# Patient Record
Sex: FEMALE | Race: BLACK OR AFRICAN AMERICAN | NOT HISPANIC OR LATINO | Employment: OTHER | URBAN - METROPOLITAN AREA
[De-identification: names, ages, dates, MRNs, and addresses within clinical notes are randomized per-mention and may not be internally consistent; named-entity substitution may affect disease eponyms.]

---

## 2018-12-04 ENCOUNTER — OFFICE VISIT (OUTPATIENT)
Dept: PHYSICAL MEDICINE AND REHAB | Facility: CLINIC | Age: 63
End: 2018-12-04
Attending: PHYSICAL MEDICINE & REHABILITATION
Payer: MEDICARE

## 2018-12-04 VITALS
DIASTOLIC BLOOD PRESSURE: 84 MMHG | SYSTOLIC BLOOD PRESSURE: 160 MMHG | HEIGHT: 67 IN | HEART RATE: 94 BPM | BODY MASS INDEX: 36.47 KG/M2 | WEIGHT: 232.38 LBS

## 2018-12-04 DIAGNOSIS — R26.9 GAIT DISORDER: Primary | ICD-10-CM

## 2018-12-04 DIAGNOSIS — G89.29 CHRONIC MIDLINE LOW BACK PAIN WITH RIGHT-SIDED SCIATICA: ICD-10-CM

## 2018-12-04 DIAGNOSIS — E66.9 OBESITY (BMI 35.0-39.9 WITHOUT COMORBIDITY): ICD-10-CM

## 2018-12-04 DIAGNOSIS — M54.41 CHRONIC MIDLINE LOW BACK PAIN WITH RIGHT-SIDED SCIATICA: ICD-10-CM

## 2018-12-04 DIAGNOSIS — E03.9 HYPOTHYROIDISM, UNSPECIFIED TYPE: ICD-10-CM

## 2018-12-04 DIAGNOSIS — M79.642 PAIN IN BOTH HANDS: ICD-10-CM

## 2018-12-04 DIAGNOSIS — R53.83 FATIGUE, UNSPECIFIED TYPE: ICD-10-CM

## 2018-12-04 DIAGNOSIS — M79.641 PAIN IN BOTH HANDS: ICD-10-CM

## 2018-12-04 DIAGNOSIS — R29.6 FREQUENT FALLS: ICD-10-CM

## 2018-12-04 DIAGNOSIS — M54.2 CHRONIC NECK PAIN: ICD-10-CM

## 2018-12-04 DIAGNOSIS — M17.0 PRIMARY OSTEOARTHRITIS OF BOTH KNEES: ICD-10-CM

## 2018-12-04 DIAGNOSIS — G89.29 CHRONIC NECK PAIN: ICD-10-CM

## 2018-12-04 PROCEDURE — 99204 OFFICE O/P NEW MOD 45 MIN: CPT | Mod: S$GLB,,, | Performed by: PHYSICAL MEDICINE & REHABILITATION

## 2018-12-04 PROCEDURE — 99999 PR PBB SHADOW E&M-NEW PATIENT-LVL III: CPT | Mod: PBBFAC,,, | Performed by: PHYSICAL MEDICINE & REHABILITATION

## 2018-12-04 PROCEDURE — 3008F BODY MASS INDEX DOCD: CPT | Mod: CPTII,S$GLB,, | Performed by: PHYSICAL MEDICINE & REHABILITATION

## 2018-12-04 NOTE — PROGRESS NOTES
Subjective:       Patient ID: Shanae Durand is a 63 y.o. female.    Chief Complaint: No chief complaint on file.    HPI     HISTORY OF PRESENT ILLNESS:  Mrs. Durand is a 63-year-old black female with multiple medical problems who is presenting to the Physical Medicine Clinic for evaluation for a power mobility device.  Her past medical history is significant for hypertension, diabetes mellitus, hypothyroidism, hepatitis C status post treatment with Harvoni, OA of the knees status post steroid injections with no relief, chronic neck pain, and obesity (weight of 232.4 pounds and a BMI of 36.4 today).    The patient lives alone in a single-mi home with three steps to enter.  She is independent with feeding, dressing, grooming and toileting.  She has trouble getting in and out of the bathtub.  She can ambulate with a rolling walker.  When her symptoms are active, she can go about 5 or 10 feet and is restricted by severe right knee pain (up to 10/10) and chronic low back pain with right-sided sciatica (5-10/10).  She also gets tired easily.  She cannot propel a manual wheelchair due to bilateral hand pain (up to 7-8/10) and to getting tired easily.  She reports history of falling, sometimes three to four times per month and so far without any serious sequelae.  She tried scooters at [employer] and was able to manipulate them without significant problems.      MS/HN  dd: 12/04/2018 16:47:52 (CST)  td: 12/05/2018 10:26:51 (CST)  Doc ID   #[id number]  Job ID #[id number]          Review of Systems   Constitutional: Positive for fatigue.   Eyes: Negative for visual disturbance.   Respiratory: Negative for shortness of breath.    Cardiovascular: Negative for chest pain.   Gastrointestinal: Positive for constipation. Negative for nausea and vomiting.   Genitourinary: Negative for difficulty urinating.   Musculoskeletal: Positive for arthralgias, back pain, gait problem and neck pain.   Neurological: Positive for weakness.  Negative for dizziness and headaches.   Psychiatric/Behavioral: Positive for sleep disturbance. Negative for behavioral problems.       Objective:      Physical Exam   Constitutional: She is oriented to person, place, and time. She appears well-developed and well-nourished.   HENT:   Head: Normocephalic and atraumatic.   Neck:   Decreased ROM.  Mild pain with rotation.  Mild tenderness.   Cardiovascular: Normal rate, regular rhythm and normal heart sounds.   Pulmonary/Chest: Effort normal and breath sounds normal.   Abdominal: Soft.   Musculoskeletal:   BUE:  ROM:   RUE: full.   LUE: full.  Strength:    RUE: 4-/5 at shoulder abduction, 4 elbow flexion, 4 elbow extension, 4 hand .   LUE: 4-/5 at shoulder abduction, 4 elbow flexion, 4 elbow extension, 4 hand .  Sensation to pinprick:   RUE: intact.   LUE: intact.  DTR:    RUE: +2 biceps, +2 triceps.   LUE:  +2 biceps, +2 triceps.  Lackey:   RUE: +ve.   LUE: +ve.    BLE:  ROM:   RLE: full.   LLE: full.  Knee crepitus:   RLE: +ve.   LLE: +ve.   Strength:    RLE: 3/5 at hip flexion, 4 knee extension, 4+ ankle DF, 4+ PF.   LLE: 4/5 at hip flexion, 4 knee extension, 4+ ankle DF, 4+ PF.  Sensation to pinprick:     RLE: intact.      LLE: intact.   DTR:     RLE: +3 knee, +2 ankle.    LLE: +3 knee, +2 ankle.  Clonus:    Rt ankle: +ve.    Lt ankle: +ve.  SLR (sitting):      RLE: +ve.      LLE: -ve.  +ve tenderness over lumbar spine.    Gait: WNL     Neurological: She is alert and oriented to person, place, and time.   Skin: Skin is warm.   Psychiatric: She has a normal mood and affect. Her behavior is normal.   Vitals reviewed.      Assessment:       1. Gait disorder    2. Primary osteoarthritis of both knees    3. Chronic midline low back pain with right-sided sciatica    4. Hypothyroidism, unspecified type    5. Chronic neck pain    6. Pain in both hands    7. Frequent falls    8. Obesity (BMI 35.0-39.9 without comorbidity)        Summary/Plan:           - The  patient was seen today for mobility evaluation for a power mobility device due to significant impairment at home.  - The patient has multifactorial gait impairment.  - The patient is not able to ambulate safely to the kitchen or living room.  - The patient is unable to use a walker functional distances due to severe Rt knee pain b/o advanced OA, chronic LBP with Rt radiculopathy, fatigue b/o hypothyroidism and obesity.  - The patient is unable to use an optimally-configured manual wheelchair at home due to bilateral hand pain b/o OA, fatigue b/o hypothyroidism and obesity.  - The patient has history of falls, which could be detrimental to her health.  - The patient has intact cognition and should be able to use a power mobility device well at home.  - The patient was given a prescription for an electric scooter.  - The patient has enough upper & lower extremity strength to be able to transfer to and from the power mobility device.  - The patient has enough range of motion & strength in BUE to allow functional operation of the tiller.  - The patient has good trunk balance and should be able to maintain a safe posture while operating a power mobility device.  - This will allow the patient to go safely to the kitchen, dining room or living room for feeding & socialization.  - The patient was also given a prescription for a Transfer Tub Bench to improve safety of bathing.  - The patient is to return the Physical Medicine/Mobility clinic prn.      For her pain management:  - X-ray AP Standing Knees with Both Latera; Future  - Ambulatory consult to Orthopedics  - X-Ray Lumbar Spine Ap Lateral w/Flex Ext; Future  - X-Ray Cervical Spine AP Lat with Flex Ex; Future (due to some upper motor neuron signs).

## 2018-12-04 NOTE — LETTER
December 4, 2018      Patrizia Harrison MD  [address]  [address]  [facility] [address]           [facility]  [address]  [facility] [address]  Phone: [phone]          Patient: Shanae Durand   MR Number: [id number]   YOB: 1955   Date of Visit: 12/4/2018       Dear Dr. Patrizia Harrison:    Thank you for referring Shanae Durand to me for evaluation. Attached you will find relevant portions of my assessment and plan of care.    If you have questions, please do not hesitate to call me. I look forward to following Shanae Durand along with you.    Sincerely,    Soraida Salazar MD    Enclosure  CC:  No Recipients    If you would like to receive this communication electronically, please contact [email or web address] or [phone] to request more information on [employer] Link access.    For providers and/or their staff who would like to refer a patient to [facility], please contact us through our one-stop-shop provider referral line, [facility], at [phone].    If you feel you have received this communication in error or would no longer like to receive these types of communications, please e-mail [email or web address]

## 2018-12-07 ENCOUNTER — HOSPITAL ENCOUNTER (OUTPATIENT)
Dept: RADIOLOGY | Facility: HOSPITAL | Age: 63
Discharge: HOME | End: 2018-12-07
Attending: PHYSICAL MEDICINE & REHABILITATION
Payer: MEDICARE

## 2018-12-07 DIAGNOSIS — M54.2 CHRONIC NECK PAIN: ICD-10-CM

## 2018-12-07 DIAGNOSIS — G89.29 CHRONIC NECK PAIN: ICD-10-CM

## 2018-12-07 DIAGNOSIS — M17.0 PRIMARY OSTEOARTHRITIS OF BOTH KNEES: ICD-10-CM

## 2018-12-07 DIAGNOSIS — M54.41 CHRONIC MIDLINE LOW BACK PAIN WITH RIGHT-SIDED SCIATICA: ICD-10-CM

## 2018-12-07 DIAGNOSIS — G89.29 CHRONIC MIDLINE LOW BACK PAIN WITH RIGHT-SIDED SCIATICA: ICD-10-CM

## 2018-12-07 PROCEDURE — 72050 X-RAY EXAM NECK SPINE 4/5VWS: CPT | Mod: TC

## 2018-12-07 PROCEDURE — 73560 X-RAY EXAM OF KNEE 1 OR 2: CPT | Mod: TC,50

## 2018-12-07 PROCEDURE — 72120 X-RAY BEND ONLY L-S SPINE: CPT | Mod: 26,,, | Performed by: RADIOLOGY

## 2018-12-07 PROCEDURE — 72050 X-RAY EXAM NECK SPINE 4/5VWS: CPT | Mod: 26,,, | Performed by: RADIOLOGY

## 2018-12-07 PROCEDURE — 72120 X-RAY BEND ONLY L-S SPINE: CPT | Mod: TC

## 2018-12-07 PROCEDURE — 73560 X-RAY EXAM OF KNEE 1 OR 2: CPT | Mod: 26,50,, | Performed by: RADIOLOGY

## 2018-12-07 PROCEDURE — 72100 X-RAY EXAM L-S SPINE 2/3 VWS: CPT | Mod: 26,,, | Performed by: RADIOLOGY

## 2018-12-21 ENCOUNTER — OFFICE VISIT (OUTPATIENT)
Dept: ORTHOPEDICS | Facility: CLINIC | Age: 63
End: 2018-12-21
Payer: MEDICARE

## 2018-12-21 ENCOUNTER — HOSPITAL ENCOUNTER (OUTPATIENT)
Dept: RADIOLOGY | Facility: HOSPITAL | Age: 63
Discharge: HOME | End: 2018-12-21
Attending: NURSE PRACTITIONER
Payer: MEDICARE

## 2018-12-21 DIAGNOSIS — M25.562 PAIN IN BOTH KNEES, UNSPECIFIED CHRONICITY: Primary | ICD-10-CM

## 2018-12-21 DIAGNOSIS — M25.561 PAIN IN BOTH KNEES, UNSPECIFIED CHRONICITY: ICD-10-CM

## 2018-12-21 DIAGNOSIS — M25.561 PAIN IN BOTH KNEES, UNSPECIFIED CHRONICITY: Primary | ICD-10-CM

## 2018-12-21 DIAGNOSIS — M25.562 PAIN IN BOTH KNEES, UNSPECIFIED CHRONICITY: ICD-10-CM

## 2018-12-21 DIAGNOSIS — M17.11 PRIMARY OSTEOARTHRITIS OF RIGHT KNEE: ICD-10-CM

## 2018-12-21 PROCEDURE — 73564 X-RAY EXAM KNEE 4 OR MORE: CPT | Mod: 26,50,, | Performed by: RADIOLOGY

## 2018-12-21 PROCEDURE — 99203 OFFICE O/P NEW LOW 30 MIN: CPT | Mod: 25,S$GLB,, | Performed by: NURSE PRACTITIONER

## 2018-12-21 PROCEDURE — 99999 PR PBB SHADOW E&M-EST. PATIENT-LVL II: CPT | Mod: PBBFAC,,, | Performed by: NURSE PRACTITIONER

## 2018-12-21 PROCEDURE — 73564 X-RAY EXAM KNEE 4 OR MORE: CPT | Mod: TC,50

## 2018-12-21 PROCEDURE — 20610 DRAIN/INJ JOINT/BURSA W/O US: CPT | Mod: RT,S$GLB,, | Performed by: NURSE PRACTITIONER

## 2018-12-21 NOTE — LETTER
December 23, 2018      Soraida Salazar MD  [address]  [facility] [address]           [facility]  [address]  [facility] [address]  Phone: [phone]          Patient: Shanae Durand   MR Number: [id number]   YOB: 1955   Date of Visit: 12/21/2018       Dear Dr. Soraida Salazar:    Thank you for referring Shanae Durand to me for evaluation. Attached you will find relevant portions of my assessment and plan of care.    If you have questions, please do not hesitate to call me. I look forward to following Shanae Durand along with you.    Sincerely,    Leighann Fried NP    Enclosure  CC:  No Recipients    If you would like to receive this communication electronically, please contact [email or web address] or [phone] to request more information on [employer] Link access.    For providers and/or their staff who would like to refer a patient to [facility], please contact us through our one-stop-shop provider referral line, [facility], at [phone].    If you feel you have received this communication in error or would no longer like to receive these types of communications, please e-mail [email or web address]

## 2018-12-23 NOTE — PROGRESS NOTES
CC: Pain of the Left Knee and Pain of the Right Knee      HPI: Pt with right knee pain for several years. She has been followed by [facility] and she has tried nsaids and cortisone injections. She has not tried gel injections, but she says that the orthopedic surgeon at [facility] recommended knee replacement. The pain is described as moderate and worse with increased activity. She is no longer able to dance or walk for long periods and she would like to resume normal activities. She is ambulating independently. There is not a limp.    ROS  General: denies fever and chills  Resp: no c/o sob  CVS: no c/o cp  MSK: c/o right knee pain which is aching and global and worse with activity    PE  General: AAOx3, pleasant and cooperative  Resp: respirations even and unlabored  MSK: right knee exam  0 degrees extension  120 degrees flexion  No warmth or erythema   - effusion    Xray:  Reviewed by me: The alignment is within normal limits.  No displaced fractures identified.  No evidence of lytic or blastic lesions.Osseous spurring identified level of the mediolateral femoral condylar region bilaterally with joint space narrowing most prevalent at level the RIGHT medial knee.  Patellofemoral spurring identified most prevalent on the RIGHT.Ossifying fibroma present RIGHT.    Assessment:  Right knee djd    Plan:  I explained to patient all treatment options prior to knee replacement. She has agreed to try gel injections prior to discussing surgery. She will work on strengthening and weight loss in the meantime.  euflexxa injections to start today in the rightt knee  F/u next week for next injection  RICE  nsaids prn    Shanae Durand presents to clinic today for the first right knee Euflexxa injection.    Exam demonstrates the no effusion in the  right knee, and the skin is intact.    Diagnosis: osteoarthritis knee    After time out was performed and patient ID, side, and site were verified, the  right  knee was sterilly prepped  in the standard fashion.  A 22-gauge needle was introduced into right knee joint from an mesfin-lateral site without complication and knee was then injected with 2 ml of Euflexxa.  Sterile dressing was applied.  The patient was instructed to resume activities as tolerated and to call with any problems.     We will see Shanae Durand back next week for the second injection.

## 2019-01-04 ENCOUNTER — OFFICE VISIT (OUTPATIENT)
Dept: ORTHOPEDICS | Facility: CLINIC | Age: 64
End: 2019-01-04
Payer: MEDICARE

## 2019-01-04 VITALS
DIASTOLIC BLOOD PRESSURE: 76 MMHG | SYSTOLIC BLOOD PRESSURE: 153 MMHG | BODY MASS INDEX: 36.47 KG/M2 | HEIGHT: 67 IN | WEIGHT: 232.38 LBS | HEART RATE: 56 BPM

## 2019-01-04 DIAGNOSIS — M17.11 PRIMARY OSTEOARTHRITIS OF RIGHT KNEE: Primary | ICD-10-CM

## 2019-01-04 PROCEDURE — 99999 PR PBB SHADOW E&M-EST. PATIENT-LVL III: ICD-10-PCS | Mod: PBBFAC,,, | Performed by: NURSE PRACTITIONER

## 2019-01-04 PROCEDURE — 99999 PR PBB SHADOW E&M-EST. PATIENT-LVL III: CPT | Mod: PBBFAC,,, | Performed by: NURSE PRACTITIONER

## 2019-01-04 PROCEDURE — 99499 UNLISTED E&M SERVICE: CPT | Mod: S$GLB,,, | Performed by: NURSE PRACTITIONER

## 2019-01-04 PROCEDURE — 99499 NO LOS: ICD-10-PCS | Mod: S$GLB,,, | Performed by: NURSE PRACTITIONER

## 2019-01-04 NOTE — PROGRESS NOTES
Pt has decided that she does not want to continue with gel injections. She is ready to see a surgeon to have a knee replacement. Appt was given to the patient to f/u with Dr. [facility].

## 2019-01-22 ENCOUNTER — HOSPITAL ENCOUNTER (OUTPATIENT)
Dept: RADIOLOGY | Facility: HOSPITAL | Age: 64
Discharge: HOME | End: 2019-01-22
Attending: ORTHOPAEDIC SURGERY
Payer: MEDICARE

## 2019-01-22 ENCOUNTER — OFFICE VISIT (OUTPATIENT)
Dept: ORTHOPEDICS | Facility: CLINIC | Age: 64
End: 2019-01-22
Payer: MEDICARE

## 2019-01-22 VITALS
HEART RATE: 96 BPM | BODY MASS INDEX: 36.27 KG/M2 | SYSTOLIC BLOOD PRESSURE: 146 MMHG | DIASTOLIC BLOOD PRESSURE: 82 MMHG | WEIGHT: 231.56 LBS

## 2019-01-22 DIAGNOSIS — M17.9 OSTEOARTHRITIS OF KNEE, UNSPECIFIED (CODE): Primary | ICD-10-CM

## 2019-01-22 DIAGNOSIS — M17.9 OSTEOARTHRITIS OF KNEE, UNSPECIFIED (CODE): ICD-10-CM

## 2019-01-22 PROCEDURE — 73560 X-RAY EXAM OF KNEE 1 OR 2: CPT | Mod: 26,RT,, | Performed by: RADIOLOGY

## 2019-01-22 PROCEDURE — 73560 X-RAY EXAM OF KNEE 1 OR 2: CPT | Mod: TC,RT

## 2019-01-22 PROCEDURE — 3008F PR BODY MASS INDEX (BMI) DOCUMENTED: ICD-10-PCS | Mod: CPTII,S$GLB,, | Performed by: ORTHOPAEDIC SURGERY

## 2019-01-22 PROCEDURE — 3008F BODY MASS INDEX DOCD: CPT | Mod: CPTII,S$GLB,, | Performed by: ORTHOPAEDIC SURGERY

## 2019-01-22 PROCEDURE — 99999 PR PBB SHADOW E&M-EST. PATIENT-LVL IV: CPT | Mod: PBBFAC,,, | Performed by: ORTHOPAEDIC SURGERY

## 2019-01-22 PROCEDURE — 99999 PR PBB SHADOW E&M-EST. PATIENT-LVL IV: ICD-10-PCS | Mod: PBBFAC,,, | Performed by: ORTHOPAEDIC SURGERY

## 2019-01-22 PROCEDURE — 99214 PR OFFICE/OUTPT VISIT, EST, LEVL IV, 30-39 MIN: ICD-10-PCS | Mod: S$GLB,,, | Performed by: ORTHOPAEDIC SURGERY

## 2019-01-22 PROCEDURE — 99214 OFFICE O/P EST MOD 30 MIN: CPT | Mod: S$GLB,,, | Performed by: ORTHOPAEDIC SURGERY

## 2019-01-22 PROCEDURE — 73560 XR KNEE 1 OR 2 VIEW RIGHT: ICD-10-PCS | Mod: 26,RT,, | Performed by: RADIOLOGY

## 2019-01-22 RX ORDER — CALCIUM CITRATE/VITAMIN D3 200MG-6.25
TABLET ORAL
Refills: 2 | COMMUNITY
Start: 2018-12-10

## 2019-01-22 RX ORDER — LEVOTHYROXINE SODIUM 100 UG/1
TABLET ORAL
Refills: 1 | COMMUNITY
Start: 2018-12-11

## 2019-01-22 RX ORDER — MENTHOL 5.8 MG/1
LOZENGE ORAL
COMMUNITY
Start: 2018-11-27

## 2019-01-22 RX ORDER — ROSUVASTATIN CALCIUM 40 MG/1
TABLET, COATED ORAL
Refills: 1 | COMMUNITY
Start: 2019-01-11

## 2019-01-22 RX ORDER — TRAMADOL HYDROCHLORIDE 50 MG/1
TABLET ORAL
Refills: 0 | COMMUNITY
Start: 2019-01-11

## 2019-01-22 RX ORDER — MELOXICAM 7.5 MG/1
TABLET ORAL
Refills: 1 | COMMUNITY
Start: 2019-01-11

## 2019-01-22 RX ORDER — NAPROXEN SODIUM 220 MG/1
TABLET, FILM COATED ORAL
Refills: 2 | COMMUNITY
Start: 2018-12-26

## 2019-01-22 RX ORDER — LOSARTAN POTASSIUM 50 MG/1
TABLET ORAL
Refills: 1 | COMMUNITY
Start: 2019-01-11

## 2019-01-22 RX ORDER — AMLODIPINE BESYLATE 10 MG/1
TABLET ORAL
Refills: 1 | COMMUNITY
Start: 2019-01-11

## 2019-01-22 NOTE — PROGRESS NOTES
HPI:    Shanae Durand is a 63 y.o. female who is here today for right knee pain x 3 years that has progressively worsened. She says the right knee pain has been progressivley worsening, She had a euflexa injection 1/6/19 this provided minimal relief. She has also had minimal relief from NSAID. She lives at home and ambulates with a cane for assistance. She has borderline diabetes and is followed at [facility]. Last a1c she doesn't remember, but says it was in the acceptable range according to her PCP. She also has HTN/HLD controlled with medication. BMI is 36  Chief Complaint   Patient presents with    Right Knee - Pain, Swelling    Arthritis     right knee   .     Duration: 3 years most severely  Intensity: moderate  Character of pain: sharp  Location: She reports that the pain is predominately  medial  Patient's pain increases with activity.  Pain is increased with weightbearing and interferes with activities of daily living.    She has tried conservative management including NSAIDS, injections, and activity modification without relief.    She has discussed options with her family and wishes to schedule TKA.     PMSFSH reviewed per clinic record       Past Medical History:   Diagnosis Date    Diabetes mellitus, type 2     Hepatitis C     s/p treatment with Harvoni    Hypertension     Monoclonal gammopathies     Thyroid disease           Current Outpatient Medications:     amLODIPine (NORVASC) 10 MG tablet, TK 1 T PO QD, Disp: , Rfl: 1    aspirin 81 MG Chew, CHEW AND SWALLOW 1 T PO QD, Disp: , Rfl: 2    levothyroxine (SYNTHROID) 100 MCG tablet, TK 1 T PO  ONCE A DAY, Disp: , Rfl: 1    losartan (COZAAR) 50 MG tablet, TK 1 T PO QD, Disp: , Rfl: 1    meloxicam (MOBIC) 7.5 MG tablet, TK 1 T PO QD, Disp: , Rfl: 1    rosuvastatin (CRESTOR) 40 MG Tab, TK 1/2 T PO QD, Disp: , Rfl: 1    traMADol (ULTRAM) 50 mg tablet, TK 1 T PO PRN FOR 30 DAYS, Disp: , Rfl: 0    TRUE METRIX GLUCOSE TEST STRIP Strp, U UTD 2 TO 3 TIMES  PER WEEK PRN UTD, Disp: , Rfl: 2    VITAMIN B-12 100 MCG tablet, , Disp: , Rfl:      Review of patient's allergies indicates:  No Known Allergies     ROS  Constitutional: Negative for fever, Negative for weight loss  HENT Negative for congestion  Cardiovascular: Negative chest pain  Respiratory: Negative Shortness of breath  Heme: Negative excessive bleeding  Skin:NegativeItching, Negative breakdown  Musculoskeletal:Positive for back pain, Positive for joint pain, Negative muscle pain, Negative muscle weakness  Neurological: Negative for numbness and paresthesias   Psychiatric/Behavioral: Negative altered mental status, Negative for depression    Physical Exam:   BP (!) 146/82   Pulse 96   Wt 105 kg (231 lb 9.5 oz)   BMI 36.27 kg/m²   General appearance: This is a well-developed, Well nourished female No obvious acute distress.  Psychiatric: normal mood and affect;  pleasant and conversant; judgment and thought content normal  Gait is coordinated. Patient has antalgic gait to the right  Cardiovascular: There are no swelling or varicosities present.   Respiratory: normal respiratory effort   Lymphatic: no adenopathy   Neurologic: alert and oriented to person, place, and time   Deep Tendon Reflexes are normal;  Coordination and Balance: Normal   Musculoskeletal   Neck    ROM shows normal flexion and extension and lateral rotation    Palpation: Non-tender    Stability is normal    Strength is normal    Skin is normal without masses and lesions    Sensation is intact to light touch   Back    ROM showsnot examined flexion, extension and  rotation    Palpation shows no masses    Stability is normal    Strength to flexion and extension well maintained    Core strength is diminished    Skin shows no rashes or cafe au lait spots;     Sensation is intact to light touch  Right hip   Range of motion normal    Left Hip  Range of motionnormal    Right Knee  Swelling Mild  TendernessMedial joint line  Range of Motion:  0-90  Motion is painfulYes  Crepitance presentYes    Right Leg  Neurologic Intact  Pulses Intact    Left Knee: Swelling None  TendernessNone  Range of Motion: 0-100   Motion is painful No    Left Leg   Neurologic Intact  Pulses Intact    Radiograph: Show severe degenerative arthritis with subchondral sclerosis, periarticular osteophytes and narrowing of joint space.  Angle: mild varus    Physical therapy is contraindicated due to potential bone loss on this severe arthritic joint.    Assessment:  Knee arthritis right      She will need to be cleared in our PreOp center.         .    She  has a past medical history of Diabetes mellitus, type 2, Hepatitis C, Hypertension, Monoclonal gammopathies, and Thyroid disease. . We will have to take this into account. She  will be followed by the hospitalist service while in the hospital.       We have gone over the hospitalization and recovery with her as well.  This is typically around 2 weeks on a walker and transition to a cane after that.  She will have home health likely for 3-4 weeks and then transition to outpatient if necessary.  She is agreement with this plan of care and is ready to proceed.  I will see her back for clinical recheck at the 2-week postop gricelda.  I will see her back for clinical recheck for any other questions or problems as needed and certainly for any other issues in the interim.    We have discussed risks of total knee replacement which include but are not limited to blood clots in the legs that can travel to the lungs (pulmonary embolism). Pulmonary embolism can cause shortness of breath, chest pain, and even shock. Other risks include urinary tract infection, nausea and vomiting (usually related to pain medication), chronic knee pain and stiffness, bleeding into the knee joint, nerve damage, blood vessel injury, and infection of the knee which can require re-operation. Furthermore, the risks of anesthesia include potential heart, lung, kidney, and  liver damage.    Discussed with patient that weight loss will aid in her recovery. Also discussed  that her back pain will not be resolved with this surgery. Her last steroid injections was many months ago, last euflexa injection was 1/6/19. She agrees to surgery and selects April 3, 2019 as date for surgery.

## 2019-01-22 NOTE — LETTER
January 22, 2019      Sonja Weaver PA-C  [address]  [facility] [address]           [facility]  [address]  [facility] [address]  Phone: [phone]          Patient: Shanae Durand   MR Number: [id number]   YOB: 1955   Date of Visit: 1/22/2019       Dear Sonja Weaver:    Thank you for referring Shanae Durand to me for evaluation. Attached you will find relevant portions of my assessment and plan of care.    If you have questions, please do not hesitate to call me. I look forward to following Shanae Durand along with you.    Sincerely,    Sakina Munoz LPN    Enclosure  CC:  No Recipients    If you would like to receive this communication electronically, please contact [email or web address] or [phone] to request more information on [employer] Link access.    For providers and/or their staff who would like to refer a patient to [facility], please contact us through our one-stop-shop provider referral line, [facility] , at [phone].    If you feel you have received this communication in error or would no longer like to receive these types of communications, please e-mail [email or web address]

## 2019-02-08 ENCOUNTER — HOSPITAL ENCOUNTER (EMERGENCY)
Facility: HOSPITAL | Age: 64
Discharge: HOME | End: 2019-02-08
Attending: EMERGENCY MEDICINE
Payer: MEDICARE

## 2019-02-08 VITALS
RESPIRATION RATE: 16 BRPM | HEART RATE: 70 BPM | DIASTOLIC BLOOD PRESSURE: 82 MMHG | OXYGEN SATURATION: 100 % | TEMPERATURE: 98 F | SYSTOLIC BLOOD PRESSURE: 159 MMHG

## 2019-02-08 DIAGNOSIS — M17.0 OSTEOARTHRITIS OF BOTH KNEES, UNSPECIFIED OSTEOARTHRITIS TYPE: ICD-10-CM
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
## 2019-02-08 PROCEDURE — 99283 EMERGENCY DEPT VISIT LOW MDM: CPT | Mod: 25

## 2019-02-08 PROCEDURE — 12011 PR RESUPERF WND FACE <2.5 CM: ICD-10-PCS | Mod: ,,, | Performed by: EMERGENCY MEDICINE

## 2019-02-08 PROCEDURE — 99284 EMERGENCY DEPT VISIT MOD MDM: CPT | Mod: 25,,, | Performed by: EMERGENCY MEDICINE

## 2019-02-08 PROCEDURE — 99284 PR EMERGENCY DEPT VISIT,LEVEL IV: ICD-10-PCS | Mod: 25,,, | Performed by: EMERGENCY MEDICINE

## 2019-02-08 PROCEDURE — 25000003 PHARM REV CODE 250: Performed by: STUDENT IN AN ORGANIZED HEALTH CARE EDUCATION/TRAINING PROGRAM

## 2019-02-08 PROCEDURE — 12011 RPR F/E/E/N/L/M 2.5 CM/<: CPT

## 2019-02-08 PROCEDURE — 12011 RPR F/E/E/N/L/M 2.5 CM/<: CPT | Mod: ,,, | Performed by: EMERGENCY MEDICINE

## 2019-02-08 RX ORDER — METHOCARBAMOL 500 MG/1
500 TABLET, FILM COATED ORAL EVERY 8 HOURS PRN
Qty: 30 TABLET | Refills: 0 | Status: SHIPPED | OUTPATIENT
Start: 2019-02-08 | End: 2019-02-13

## 2019-02-08 RX ORDER — NAPROXEN 500 MG/1
500 TABLET ORAL
Status: COMPLETED | OUTPATIENT
Start: 2019-02-08 | End: 2019-02-08

## 2019-02-08 RX ORDER — LIDOCAINE HYDROCHLORIDE 10 MG/ML
1 INJECTION, SOLUTION EPIDURAL; INFILTRATION; INTRACAUDAL; PERINEURAL
Status: COMPLETED | OUTPATIENT
Start: 2019-02-08 | End: 2019-02-08

## 2019-02-08 RX ADMIN — Medication: at 08:02

## 2019-02-08 RX ADMIN — LIDOCAINE HYDROCHLORIDE 10 MG: 10 INJECTION, SOLUTION EPIDURAL; INFILTRATION; INTRACAUDAL; PERINEURAL at 08:02

## 2019-02-08 RX ADMIN — NAPROXEN 500 MG: 500 TABLET ORAL at 08:02

## 2019-02-08 NOTE — ED NOTES
Report received from Ruthy Myers RN.  Pt identifiers for Shanae Durand 63 y.o. female checked and correct.    Patient lying in stretcher, appears to be resting comfortably and in no acute distress. Patient is clean and well groomed and clothing is properly fastened.    NEUROLOGICAL: The patient is awake, alert and oriented to person, time, place, and situation and speaking clearly and appropriately. Eyes open spontaneously, behavior appropriate to situation, follows commands, facial expression symmetrical, purposeful motor response noted, normal sensation in all extremities when touched with a finger.  CARDIOVASCULAR: Patient has a normal rate and regular rhythm. Pulses intact. Mild peripheral edema noted to BLE, capillary refill < 3 seconds.   RESPIRATORY: Airway is open, respirations are spontaneous, patient has a normal effort and rate, no accessory muscle use noted.   GASTROINTESTINAL: Abdomen is soft and non-tender to palpation, no distention noted, active bowel sounds present in all four quadrants.   GENITOURINARY: Patient voids spontaneously without difficulty. Patient is continent.   MUSCULOSKELETAL: Patient moving all extremities spontaneously, no obvious swelling or deformities noted. Generalized weakness reported.  INTEGUMENTARY: The skin is warm and dry, color consistent with ethnicity, patient has normal skin turgor and moist mucus membranes. Skin is intact, no breakdown or bruising noted.  Upper lip swollen from the fall.  PSYCHOSOCIAL: Calm, pleasant, and cooperative. Family present at bedside.

## 2019-02-08 NOTE — ED TRIAGE NOTES
Pt stated that she fell at home x3 once getting out of her chair, once after she tried to get out of the bed and in the bathroom. Denies LOC, dizziness, N/V, headache     Stated her knee gives out on her and she is scheduled for knee replacement in April.     Last bm 2/8

## 2019-02-08 NOTE — ED PROVIDER NOTES
"Encounter Date: 2/8/2019       History     Chief Complaint   Patient presents with    Fall     Pt arrives via EMS after falling at home and hit face. Some swelling to lip. Pt states "my knee gave out". Scheduled for knee surgery.     62 yo F with PMHx HLD, HTN, hypothyroidism, OA, usual state of health until 6 hours prior to presentation when she had multiple falls while trying to use the bathroom. She states there her knee gave out and she fell onto tile fely, onto her right knee and face hitting her lip. Knee is not weight bearing. Patient denies LOC, fever, chills, headache, dizziness, syncope, chest pain/ SOB. Also complaining of lower back pain which is chronic.           Review of patient's allergies indicates:  No Known Allergies  Past Medical History:   Diagnosis Date    Diabetes mellitus, type 2     Hepatitis C     s/p treatment with Harvoni    Hypertension     Monoclonal gammopathies     Thyroid disease      History reviewed. No pertinent surgical history.  History reviewed. No pertinent family history.  Social History     Tobacco Use    Smoking status: Current Some Day Smoker     Types: Cigarettes   Substance Use Topics    Alcohol use: No     Frequency: Never    Drug use: Not on file     Review of Systems   Constitutional: Negative for chills, fatigue and fever.   HENT: Positive for facial swelling. Negative for ear discharge, ear pain, nosebleeds and tinnitus.    Eyes: Negative for pain and visual disturbance.   Respiratory: Negative for cough, shortness of breath and wheezing.    Cardiovascular: Positive for leg swelling. Negative for chest pain and palpitations.   Gastrointestinal: Negative for abdominal distention, abdominal pain, constipation, diarrhea, nausea and vomiting.   Genitourinary: Negative for difficulty urinating and flank pain.   Musculoskeletal: Positive for arthralgias, back pain (lower back chronic ) and joint swelling. Negative for myalgias, neck pain and neck stiffness. "   Skin: Positive for wound.   Neurological: Negative for dizziness, syncope, facial asymmetry, speech difficulty, weakness, light-headedness, numbness and headaches.       Physical Exam     Initial Vitals [02/08/19 0552]   BP Pulse Resp Temp SpO2   134/68 83 16 97.4 °F (36.3 °C) 98 %      MAP       --         Physical Exam    Constitutional: She appears well-developed and well-nourished. She is not diaphoretic. No distress.   HENT:   Head: Normocephalic and atraumatic.   Mouth/Throat: Oropharynx is clear and moist.   Swelling of upper lip. 1 cm laceration on interior of upper lip    Eyes: Pupils are equal, round, and reactive to light.   Neck: Normal range of motion.   Cardiovascular: Normal rate, regular rhythm, normal heart sounds and intact distal pulses. Exam reveals no gallop and no friction rub.    No murmur heard.  Pulmonary/Chest: Breath sounds normal. No respiratory distress. She has no wheezes.   Abdominal: Soft. Bowel sounds are normal.   Musculoskeletal: Normal range of motion. She exhibits edema (1+ non pitting LE ) and tenderness (Right knee ).   No point tenderness of spine. No abrasions or hematomas noted on body other than lip and right knee    Neurological: She is alert and oriented to person, place, and time.   Weak bilaterally LE    Skin: Skin is warm and dry. Capillary refill takes less than 2 seconds.         ED Course   Lac Repair  Date/Time: 2/8/2019 9:11 AM  Performed by: Esteban May MD  Authorized by: Perri Gonzalez MD   Consent Done: Yes  Consent: Verbal consent obtained.  Consent given by: patient  Body area: mouth  Location details: upper lip, interior  Laceration length: 1 cm  Foreign bodies: no foreign bodies  Tendon involvement: none  Nerve involvement: none  Vascular damage: no  Anesthesia: local infiltration    Anesthesia:  Local Anesthetic: lidocaine 1% without epinephrine  Anesthetic total: 2 mL  Patient sedated: no  Irrigation solution: tap water (tap water  with hydrogen peroxide )  Amount of cleaning: standard  Debridement: none  Degree of undermining: none  Wound skin closure material used: 5-0 vicryl rapide.  Number of sutures: 1  Technique: simple  Approximation: close  Dressing: open (no dressing)  Patient tolerance: Patient tolerated the procedure well with no immediate complications        Labs Reviewed - No data to display       Imaging Results          X-Ray Knee 3 View Right (Final result)  Result time 02/08/19 07:08:07    Final result by Peter Santos MD (02/08/19 07:08:07)                 Impression:      No evidence of fracture or dislocation.      Electronically signed by: Peter Santos MD  Date:    02/08/2019  Time:    07:08             Narrative:    EXAMINATION:  XR KNEE 3 VIEW RIGHT    CLINICAL HISTORY:  Unspecified fall, initial encounter    TECHNIQUE:  AP, lateral, and Merchant views of the right knee were performed.    COMPARISON:  Plain film from 01/22/2019    FINDINGS:  No evidence of fracture or dislocation.  Chondrocalcinosis of the lateral compartment.  Mild joint space narrowing of the medial compartment.  Vascular calcification noted.                                 Medical Decision Making:   Initial Assessment:   62 yo F with hx of obesity , DM2, HTN, HLD, OA, presenting after mechanical fall onto tile floor morning of presentation. Patient with known OA of both knees, scheduled for total right knee replacement April 2019, ambulates with cane, states right knee gave out this morning. Denies LOC, lightheadedness, vertigo, SOB.   Differential Diagnosis:   DDx includes mechanical fall vs syncope.        APC / Resident Notes:   7:40 AM  3 view right knee xray with no acute abnormalities noted.   9:34 AM  Lacerated of upper lip sutured with 5-0 vicryl rapide. Walker order for patient and prescription for methocarbamol 500 mg PO TID PRN for pain. Instructions to f/u with PCP, and ED precautions.     Esteban May MD  Resident  Physician, PGY1                   Clinical Impression:   The primary encounter diagnosis was Fall. A diagnosis of Osteoarthritis of both knees, unspecified osteoarthritis type was also pertinent to this visit.                              Esteban May MD  Resident  02/08/19 0783

## 2019-03-14 ENCOUNTER — ANESTHESIA EVENT (OUTPATIENT)
Dept: SURGERY | Facility: HOSPITAL | Age: 64
End: 2019-03-14

## 2019-03-14 NOTE — ANESTHESIA PREPROCEDURE EVALUATION
Anesthesia Assessment: Preoperative EQUATION    Planned Procedure: Procedure(s) (LRB):  REPLACEMENT-KNEE-TOTAL (Right)  Requested Anesthesia Type:Femoral Block  Surgeon: John L. Ochsner Jr., MD  Service: Orthopedics  Known or anticipated Date of Surgery:4/3/2019    Plan:    Testing:  CBC, CMP, PT/INR, EKG and UA   Pre-anesthesia  visit       Visit focus: possible regional anesthesia and/or nerve block      Consultation:IM Perioperative Hospitalist     Lauren Jackman RN 03/14/2019 03/14/2019  Shanae Durand is a 63 y.o., female.    Pre-op Assessment         Review of Systems

## 2019-03-17 DIAGNOSIS — Z91.89 AT RISK OF UTI: ICD-10-CM

## 2019-03-17 DIAGNOSIS — M79.606 PAIN OF LOWER EXTREMITY, UNSPECIFIED LATERALITY: Primary | ICD-10-CM

## 2019-03-17 DIAGNOSIS — E11.9 DM TYPE 2, GOAL A1C TO BE DETERMINED: ICD-10-CM

## 2019-03-17 DIAGNOSIS — I10 HYPERTENSION, UNSPECIFIED TYPE: ICD-10-CM

## 2019-03-17 RX ORDER — METFORMIN HYDROCHLORIDE 500 MG/1
500 TABLET ORAL
COMMUNITY

## 2019-03-19 ENCOUNTER — OFFICE VISIT (OUTPATIENT)
Dept: ORTHOPEDICS | Facility: CLINIC | Age: 64
End: 2019-03-19
Payer: MEDICARE

## 2019-03-19 VITALS
HEIGHT: 67 IN | HEART RATE: 78 BPM | SYSTOLIC BLOOD PRESSURE: 144 MMHG | BODY MASS INDEX: 36.27 KG/M2 | DIASTOLIC BLOOD PRESSURE: 80 MMHG

## 2019-03-19 DIAGNOSIS — M17.11 PRIMARY OSTEOARTHRITIS OF RIGHT KNEE: Primary | ICD-10-CM

## 2019-03-19 PROCEDURE — 99499 UNLISTED E&M SERVICE: CPT | Mod: S$GLB,,, | Performed by: NURSE PRACTITIONER

## 2019-03-19 PROCEDURE — 99499 NO LOS: ICD-10-PCS | Mod: S$GLB,,, | Performed by: NURSE PRACTITIONER

## 2019-03-19 PROCEDURE — 99999 PR PBB SHADOW E&M-EST. PATIENT-LVL III: CPT | Mod: PBBFAC,,, | Performed by: NURSE PRACTITIONER

## 2019-03-19 PROCEDURE — 99999 PR PBB SHADOW E&M-EST. PATIENT-LVL III: ICD-10-PCS | Mod: PBBFAC,,, | Performed by: NURSE PRACTITIONER

## 2019-03-19 NOTE — PROGRESS NOTES
Ms. Durand came in for a preop appt for surgery today and she is unable to balance or walk without a walker. She reports that she is being worked up by her pcp at [facility] for this new onset weakness and pain, but she has not gotten the results of her labwork yet. Dr. Ochsner came to the room and had her walk in the hallway. There is difficulty with walking due to pain in her back per her report. She is unable to write or put her earrings in due to pain and stiffness in her hands. Surgery is cancelled for now. She will f/u once she has improved and is able to function independently. She understands and will f/u when ready.

## 2019-03-19 NOTE — H&P
CC: Right knee pain    Shanae Durand is a 63 y.o. female with a history of Right knee pain. Pain is worse with activity and weight bearing.  Patient has experienced interference of activities of daily living due to decreased range of motion and an increase in joint pain and swelling.  Patient has failed non-operative treatment including NSAIDs, corticosteroid injections, viscosupplement injections, and activity modification.  Shanae Durand currently ambulates with a walker.     Relevant medical conditions of significance in perioperative period:  Hypothyroidism- on medication managed by pc  HTN- on medication managed by pcp  DM- on medication managed by pcp      Past Medical History:   Diagnosis Date    Diabetes mellitus, type 2     Hepatitis C     s/p treatment with Harvoni    Hypertension     Monoclonal gammopathies     Thyroid disease        History reviewed. No pertinent surgical history.    History reviewed. No pertinent family history.    Review of patient's allergies indicates:  No Known Allergies      Current Outpatient Medications:     amLODIPine (NORVASC) 10 MG tablet, TK 1 T PO QD, Disp: , Rfl: 1    aspirin 81 MG Chew, CHEW AND SWALLOW 1 T PO QD, Disp: , Rfl: 2    levothyroxine (SYNTHROID) 100 MCG tablet, TK 1 T PO  ONCE A DAY, Disp: , Rfl: 1    losartan (COZAAR) 50 MG tablet, TK 1 T PO QD, Disp: , Rfl: 1    meloxicam (MOBIC) 7.5 MG tablet, TK 1 T PO QD, Disp: , Rfl: 1    metFORMIN (GLUCOPHAGE) 500 MG tablet, Take 500 mg by mouth daily with breakfast., Disp: , Rfl:     rosuvastatin (CRESTOR) 40 MG Tab, TK 1/2 T PO QD, Disp: , Rfl: 1    traMADol (ULTRAM) 50 mg tablet, TK 1 T PO PRN FOR 30 DAYS, Disp: , Rfl: 0    TRUE METRIX GLUCOSE TEST STRIP Strp, U UTD 2 TO 3 TIMES PER WEEK PRN UTD, Disp: , Rfl: 2    VITAMIN B-12 100 MCG tablet, , Disp: , Rfl:     Review of Systems:  Constitutional: no fever or chills  Eyes: no visual changes  ENT: no nasal congestion or sore throat  Respiratory: no cough or  shortness of breath  Cardiovascular: no chest pain or palpitations  Gastrointestinal: no nausea or vomiting, tolerating diet  Genitourinary: no hematuria or dysuria  Integument/Breast: no rash or pruritis  Hematologic/Lymphatic: no easy bruising or lymphadenopathy  Musculoskeletal: positive for C/o right knee pain worse with increased activity  Neurological: no seizures or tremors  Behavioral/Psych: no auditory or visual hallucinations  Endocrine: no heat or cold intolerance    PE:  There were no vitals taken for this visit.  General: Pleasant, cooperative, NAD   Gait: antalgic  HEENT: NCAT, sclera nonicteric   Lungs: Respirations clear bilaterally; equal and unlabored.   CV: S1S2; 2+ bilateral upper and lower extremity pulses.   Skin: Intact throughout with no rashes, erythema, or lesions  Extremities: No LE edema,  no erythema or warmth of the skin in either lower extremity.    Right knee exam:  Knee Range of Motion:normal, pain with passive range of motion  Effusion:none  Condition of skin:intact  Location of tenderness:Medial joint line and patella  Strength:normal  Stability: stable to testing    Hip Examination:normal    Radiographs: Radiographs reveal No evidence of fracture or dislocation.  Chondrocalcinosis of the lateral compartment.  Mild joint space narrowing of the medial compartment.  Vascular calcification noted    Knee Alignment: normal    Diagnosis: osteoarthritis Right knee    Plan: Right total knee arthroplasty    Due to the serious nature of total joint infection and the high prevalence of community acquired MRSA, vancomycin will be used perioperatively.

## 2019-04-03 ENCOUNTER — ANESTHESIA (OUTPATIENT)
Dept: SURGERY | Facility: HOSPITAL | Age: 64
End: 2019-04-03